# Patient Record
Sex: FEMALE | Race: WHITE | NOT HISPANIC OR LATINO | URBAN - METROPOLITAN AREA
[De-identification: names, ages, dates, MRNs, and addresses within clinical notes are randomized per-mention and may not be internally consistent; named-entity substitution may affect disease eponyms.]

---

## 2023-01-05 ENCOUNTER — OFFICE VISIT (OUTPATIENT)
Dept: ENDOCRINOLOGY | Facility: CLINIC | Age: 23
End: 2023-01-05
Attending: INTERNAL MEDICINE
Payer: MEDICAID

## 2023-01-05 DIAGNOSIS — Z79.899 TRANSGENDER MAN ON HORMONE THERAPY: ICD-10-CM

## 2023-01-05 DIAGNOSIS — F64.0 TRANSGENDER MAN ON HORMONE THERAPY: ICD-10-CM

## 2023-01-05 PROCEDURE — 1160F RVW MEDS BY RX/DR IN RCRD: CPT | Mod: CPTII,95,, | Performed by: INTERNAL MEDICINE

## 2023-01-05 PROCEDURE — 1160F PR REVIEW ALL MEDS BY PRESCRIBER/CLIN PHARMACIST DOCUMENTED: ICD-10-PCS | Mod: CPTII,95,, | Performed by: INTERNAL MEDICINE

## 2023-01-05 PROCEDURE — 99204 OFFICE O/P NEW MOD 45 MIN: CPT | Mod: 95,,, | Performed by: INTERNAL MEDICINE

## 2023-01-05 PROCEDURE — 1159F MED LIST DOCD IN RCRD: CPT | Mod: CPTII,95,, | Performed by: INTERNAL MEDICINE

## 2023-01-05 PROCEDURE — 1159F PR MEDICATION LIST DOCUMENTED IN MEDICAL RECORD: ICD-10-PCS | Mod: CPTII,95,, | Performed by: INTERNAL MEDICINE

## 2023-01-05 PROCEDURE — 99204 PR OFFICE/OUTPT VISIT, NEW, LEVL IV, 45-59 MIN: ICD-10-PCS | Mod: 95,,, | Performed by: INTERNAL MEDICINE

## 2023-01-05 RX ORDER — TESTOSTERONE CYPIONATE 200 MG/ML
20 INJECTION, SOLUTION INTRAMUSCULAR
Qty: 10 ML | Refills: 5 | Status: SHIPPED | OUTPATIENT
Start: 2023-01-05 | End: 2023-05-15 | Stop reason: SDUPTHER

## 2023-01-05 NOTE — PROGRESS NOTES
Subjective:      Patient ID: Sol Sheikh is a 22 y.o.    Chief Complaint:  Gender    History of Present Illness  With regards to the gender incongruence care:    Gender identity - trans masc    Pronouns:  he/him  or they/them     Goals of therapy:  Deeper voice, increased body hair, increased facial hair    Sexual activity:  men women          Therapist at the time hormones were started: yes has a therapist (not for gender) but she is supportive     Gender Surgeries - none, but interested in mastectomy          Fertility concerns - not  interested    Has not started cross hormone therapy yet         LMP: reg        Partner-- transman afab          Social:   Work /School- [employer]   Will use their college program   Family -  supportive     Housing - lives at home       part time at  - has dementia     Has been diagnosed with ADHD     New concerns today:     None         ROS:   As above    Objective:     There were no vitals taken for this visit.    There is no height or weight on file to calculate BMI.      Physical Exam  Constitutional:       Appearance: Normal appearance.   Psychiatric:         Attention and Perception: Attention normal.         Mood and Affect: Mood normal.         Speech: Speech normal.         Behavior: Behavior normal.         Thought Content: Thought content normal.         Judgment: Judgment normal.              Lab Review:   No results found for: HGBA1C  No results found for: CHOL, HDL, LDLCALC, TRIG, CHOLHDL  No results found for: NA, K, CL, CO2, GLU, BUN, CREATININE, CALCIUM, PROT, ALBUMIN, BILITOT, ALKPHOS, AST, ALT, ANIONGAP, ESTGFRAFRICA, EGFRNONAA, TSH  No results found for: [id number]    Assessment and Plan     trans masculine (he/they)  Transman: gender incongruence   Reviewed therapy, side effects (both wanted and unwanted), possible adverse outcomes, expectations, compliance.  Reviewed what changes would be irreversible even with cessation of therapy (including facial hair,  body hair, voice deepening and clitoral enlargement). Reviewed limited data on fertility    Reviewed the need for contraception as testosterone is not a contraceptive if having vaginal sex with non-trans men       Patient encouraged to continue working with his therapist during the transition process.      Will start  Testosterone replacement therapy 20 mg q 1 week     His partner will help him with the injections.  If he has any difficulty he will let us know in any will come to clinic for injection Education      RTC in 3 months with labs   Noting  Nl hh/ for men is:     Hemoglobin 14.0-18.0 g/dL    Hematocrit 40.0-54.0 %        Healthy lifestyle stressed  Discussed indications  for a Pap.       Reviewed risks and benefits.  ? Possible increase cad  Expected increase in h/h, changes in lipids and body composition   Possible liver effects  He understands and wishes to proceed       The patient location is: home  The chief complaint leading to consultation is: gender    Visit type: audiovisual    Face to Face time with patient: 25 minutes of total time spent on the encounter, which includes face to face time and non-face to face time preparing to see the patient (eg, review of tests), Obtaining and/or reviewing separately obtained history, Documenting clinical information in the electronic or other health record, Independently interpreting results (not separately reported) and communicating results to the patient/family/caregiver, or Care coordination (not separately reported).         Each patient to whom he or she provides medical services by telemedicine is:  (1) informed of the relationship between the physician and patient and the respective role of any other health care provider with respect to management of the patient; and (2) notified that he or she may decline to receive medical services by telemedicine and may withdraw from such care at any time.

## 2023-01-05 NOTE — ASSESSMENT & PLAN NOTE
Transman: gender incongruence   Reviewed therapy, side effects (both wanted and unwanted), possible adverse outcomes, expectations, compliance.  Reviewed what changes would be irreversible even with cessation of therapy (including facial hair, body hair, voice deepening and clitoral enlargement). Reviewed limited data on fertility    Reviewed the need for contraception as testosterone is not a contraceptive if having vaginal sex with non-trans men       Patient encouraged to continue working with his therapist during the transition process.      Will start  Testosterone replacement therapy 20 mg q 1 week     His partner will help him with the injections.  If he has any difficulty he will let us know in any will come to clinic for injection Education      RTC in 3 months with labs   Noting  Nl hh/ for men is:     Hemoglobin 14.0-18.0 g/dL    Hematocrit 40.0-54.0 %        Healthy lifestyle stressed  Discussed indications  for a Pap.       Reviewed risks and benefits.  ? Possible increase cad  Expected increase in h/h, changes in lipids and body composition   Possible liver effects  He understands and wishes to proceed

## 2023-01-05 NOTE — PATIENT INSTRUCTIONS
Thank you for completing a virtual visit with me!     Per our conversation, I will send in the prescription for low-dose injectable testosterone to the pharmacy on file.  Please let my office know if you have any difficulty with the injections so we can have you come in for teaching.  Please see chart below regarding expectations with testosterone therapy, timeline, and what is reversible and irreversible    We will plan a telemedicine or an in-clinic visit in 3 months with labs prior to that appointment.    My staff will contact you to schedule the above.     Please let me know if you have any other questions.    Thank you,  Jeanne Paredes MD

## 2023-01-09 ENCOUNTER — TELEPHONE (OUTPATIENT)
Dept: ENDOCRINOLOGY | Facility: CLINIC | Age: 23
End: 2023-01-09
Payer: COMMERCIAL

## 2023-01-09 NOTE — TELEPHONE ENCOUNTER
----- Message from Klaudia Regalado MA sent at 1/6/2023  6:20 PM CST -----  Regarding: FW: Pt Advice  Contact: [phone]    ----- Message -----  From: Dottie Alejo  Sent: 1/6/2023   4:33 PM CST  To: [facility] Staff  Subject: Pt Advice                                        Pt is calling with advice on where to administer the Depo testosterone.  Please call.

## 2023-05-15 ENCOUNTER — OFFICE VISIT (OUTPATIENT)
Dept: ENDOCRINOLOGY | Facility: CLINIC | Age: 23
End: 2023-05-15
Payer: COMMERCIAL

## 2023-05-15 DIAGNOSIS — F64.0 TRANSGENDER MAN ON HORMONE THERAPY: ICD-10-CM

## 2023-05-15 DIAGNOSIS — Z79.899 TRANSGENDER MAN ON HORMONE THERAPY: ICD-10-CM

## 2023-05-15 PROCEDURE — 99214 PR OFFICE/OUTPT VISIT, EST, LEVL IV, 30-39 MIN: ICD-10-PCS | Mod: 95,,, | Performed by: INTERNAL MEDICINE

## 2023-05-15 PROCEDURE — 1160F RVW MEDS BY RX/DR IN RCRD: CPT | Mod: CPTII,95,, | Performed by: INTERNAL MEDICINE

## 2023-05-15 PROCEDURE — 1160F PR REVIEW ALL MEDS BY PRESCRIBER/CLIN PHARMACIST DOCUMENTED: ICD-10-PCS | Mod: CPTII,95,, | Performed by: INTERNAL MEDICINE

## 2023-05-15 PROCEDURE — 99214 OFFICE O/P EST MOD 30 MIN: CPT | Mod: 95,,, | Performed by: INTERNAL MEDICINE

## 2023-05-15 PROCEDURE — 1159F PR MEDICATION LIST DOCUMENTED IN MEDICAL RECORD: ICD-10-PCS | Mod: CPTII,95,, | Performed by: INTERNAL MEDICINE

## 2023-05-15 PROCEDURE — 1159F MED LIST DOCD IN RCRD: CPT | Mod: CPTII,95,, | Performed by: INTERNAL MEDICINE

## 2023-05-15 RX ORDER — TESTOSTERONE CYPIONATE 200 MG/ML
40 INJECTION, SOLUTION INTRAMUSCULAR
Qty: 1 ML | Refills: 0 | Status: SHIPPED | OUTPATIENT
Start: 2023-05-15 | End: 2023-06-14

## 2023-05-15 RX ORDER — FERROUS SULFATE TAB 325 MG (65 MG ELEMENTAL FE) 325 (65 FE) MG
TAB ORAL EVERY OTHER DAY
COMMUNITY
Start: 2023-04-28

## 2023-05-15 RX ORDER — DEXTROAMPHETAMINE SACCHARATE, AMPHETAMINE ASPARTATE MONOHYDRATE, DEXTROAMPHETAMINE SULFATE AND AMPHETAMINE SULFATE 5; 5; 5; 5 MG/1; MG/1; MG/1; MG/1
CAPSULE, EXTENDED RELEASE ORAL
COMMUNITY
Start: 2022-09-13

## 2023-05-15 NOTE — PATIENT INSTRUCTIONS
Thank you for completing a virtual visit with me!     Per our conversation, once I get back your recent labs back... we can increase your dose of testosterone to 40 mg weekly - this is 0.2 mL            My nurse will reach out to arrange for a quick in clinic visit soon.  Let me know if you have any questions     Thank you,  Jeanne Paredes MD

## 2023-05-15 NOTE — ASSESSMENT & PLAN NOTE
gender incongruence   Reviewed therapy, side effects (both wanted and unwanted), possible adverse outcomes, expectations, compliance.  Reviewed what changes would be irreversible even with cessation of therapy (including facial hair, body hair, voice deepening and clitoral enlargement).      Patient encouraged to continue working with his therapist during the transition process.      Will increase Testosterone replacement therapy 40 mg q 1 week  - once I see a recent labs    Come in for a visit May 26 with his partner for the once yearly clinic visit      RTC in 3 months with labs   Noting  Nl hh/ for men is:     Hemoglobin 14.0-18.0 g/dL    Hematocrit 40.0-54.0 %        Healthy lifestyle stressed  Discussed indications  for a Pap.       Reviewed risks and benefits.  ? Possible increase cad  Expected increase in h/h, changes in lipids and body composition   Possible liver effects

## 2023-05-15 NOTE — Clinical Note
Need recent labs that were drawn at [facility]. Please put on May 26 in the morning also his partner who is also a trans man needs an appointment at the same time as they are driving from [place].

## 2023-05-15 NOTE — PROGRESS NOTES
Subjective:      Patient ID: Sol Sheikh is a 22 y.o.    Chief Complaint:  Gender    History of Present Illness  With regards to the gender incongruence care:    Gender identity - trans masc    Pronouns:  he/him  or they/them     Goals of therapy:  Deeper voice, increased body hair, increased facial hair    Sexual activity:  men women          Therapist at the time hormones were started: yes has a therapist (not for gender) but she is supportive     Gender Surgeries - none, but interested in mastectomy          Fertility concerns - not  interested      started cross hormone therapy - January of 2023   Current regimen testosterone 20 mg weekly   His partner was injecting --now he has been trying but feels that he is not as successful with it.  He does not want to switch to topical at this time    He is happy he started-He is seeing positive changes.         LMP: feb 2023         Partner-- mathew rebolledo          Social:   Work /School- [employer]   Will use their college program   Family -  supportive     Housing - lives at home       part time at  - has dementia     Has been diagnosed with ADHD     New concerns today:     None         ROS:   As above    Objective:     There were no vitals taken for this visit.    There is no height or weight on file to calculate BMI.      Physical Exam  Constitutional:       Appearance: Normal appearance.   Psychiatric:         Attention and Perception: Attention normal.         Mood and Affect: Mood normal.         Speech: Speech normal.         Behavior: Behavior normal.         Thought Content: Thought content normal.         Judgment: Judgment normal.              Lab Review:  pending  -  [facility]   No results found for: HGBA1C  No results found for: CHOL, HDL, LDLCALC, TRIG, CHOLHDL  No results found for: NA, K, CL, CO2, GLU, BUN, CREATININE, CALCIUM, PROT, ALBUMIN, BILITOT, ALKPHOS, AST, ALT, ANIONGAP, ESTGFRAFRICA, EGFRNONAA, TSH  No results found for:  [id number]    Assessment and Plan     trans masculine (he/they)   gender incongruence   Reviewed therapy, side effects (both wanted and unwanted), possible adverse outcomes, expectations, compliance.  Reviewed what changes would be irreversible even with cessation of therapy (including facial hair, body hair, voice deepening and clitoral enlargement).      Patient encouraged to continue working with his therapist during the transition process.      Will increase Testosterone replacement therapy 40 mg q 1 week  - once I see a recent labs    Come in for a visit May 26 with his partner for the once yearly clinic visit      RTC in 3 months with labs   Noting  Nl hh/ for men is:     Hemoglobin 14.0-18.0 g/dL    Hematocrit 40.0-54.0 %        Healthy lifestyle stressed  Discussed indications  for a Pap.       Reviewed risks and benefits.  ? Possible increase cad  Expected increase in h/h, changes in lipids and body composition   Possible liver effects       The patient location is: home  The chief complaint leading to consultation is: gender    Visit type: audiovisual    Face to Face time with patient: 25 minutes of total time spent on the encounter, which includes face to face time and non-face to face time preparing to see the patient (eg, review of tests), Obtaining and/or reviewing separately obtained history, Documenting clinical information in the electronic or other health record, Independently interpreting results (not separately reported) and communicating results to the patient/family/caregiver, or Care coordination (not separately reported).         Each patient to whom he or she provides medical services by telemedicine is:  (1) informed of the relationship between the physician and patient and the respective role of any other health care provider with respect to management of the patient; and (2) notified that he or she may decline to receive medical services by telemedicine and may withdraw from such care  at any time.

## 2023-05-16 ENCOUNTER — PATIENT MESSAGE (OUTPATIENT)
Dept: ENDOCRINOLOGY | Facility: CLINIC | Age: 23
End: 2023-05-16
Payer: COMMERCIAL

## 2023-05-22 ENCOUNTER — TELEPHONE (OUTPATIENT)
Dept: ENDOCRINOLOGY | Facility: CLINIC | Age: 23
End: 2023-05-22
Payer: COMMERCIAL

## 2023-05-26 ENCOUNTER — OFFICE VISIT (OUTPATIENT)
Dept: ENDOCRINOLOGY | Facility: CLINIC | Age: 23
End: 2023-05-26
Attending: INTERNAL MEDICINE
Payer: COMMERCIAL

## 2023-05-26 DIAGNOSIS — Z79.899 TRANSGENDER MAN ON HORMONE THERAPY: Primary | ICD-10-CM

## 2023-05-26 DIAGNOSIS — F64.0 TRANSGENDER MAN ON HORMONE THERAPY: Primary | ICD-10-CM

## 2023-05-26 PROCEDURE — 99499 NO LOS: ICD-10-PCS | Mod: 95,,, | Performed by: INTERNAL MEDICINE

## 2023-05-26 PROCEDURE — 99499 UNLISTED E&M SERVICE: CPT | Mod: 95,,, | Performed by: INTERNAL MEDICINE

## 2023-05-26 NOTE — PROGRESS NOTES
Subjective:       Needs in clinic visit     Lab Review:  5/12  T 513  Tsh 1.1  Cmp ok  Chol 113  Trig 36   Hdl 53  H/h 12.8/40.8  Vit D < 5

## 2024-06-20 ENCOUNTER — OFFICE VISIT (OUTPATIENT)
Dept: ENDOCRINOLOGY | Facility: CLINIC | Age: 24
End: 2024-06-20
Payer: COMMERCIAL

## 2024-06-20 VITALS
SYSTOLIC BLOOD PRESSURE: 128 MMHG | HEART RATE: 102 BPM | BODY MASS INDEX: 23.76 KG/M2 | WEIGHT: 147.81 LBS | HEIGHT: 66 IN | DIASTOLIC BLOOD PRESSURE: 72 MMHG | OXYGEN SATURATION: 99 %

## 2024-06-20 DIAGNOSIS — F64.0 TRANSGENDER MAN ON HORMONE THERAPY: ICD-10-CM

## 2024-06-20 DIAGNOSIS — Z79.899 TRANSGENDER MAN ON HORMONE THERAPY: ICD-10-CM

## 2024-06-20 PROCEDURE — 1159F MED LIST DOCD IN RCRD: CPT | Mod: CPTII,S$GLB,, | Performed by: INTERNAL MEDICINE

## 2024-06-20 PROCEDURE — 99999 PR PBB SHADOW E&M-EST. PATIENT-LVL III: CPT | Mod: PBBFAC,,, | Performed by: INTERNAL MEDICINE

## 2024-06-20 PROCEDURE — 1160F RVW MEDS BY RX/DR IN RCRD: CPT | Mod: CPTII,S$GLB,, | Performed by: INTERNAL MEDICINE

## 2024-06-20 PROCEDURE — 3078F DIAST BP <80 MM HG: CPT | Mod: CPTII,S$GLB,, | Performed by: INTERNAL MEDICINE

## 2024-06-20 PROCEDURE — 3008F BODY MASS INDEX DOCD: CPT | Mod: CPTII,S$GLB,, | Performed by: INTERNAL MEDICINE

## 2024-06-20 PROCEDURE — 99214 OFFICE O/P EST MOD 30 MIN: CPT | Mod: S$GLB,,, | Performed by: INTERNAL MEDICINE

## 2024-06-20 PROCEDURE — G2211 COMPLEX E/M VISIT ADD ON: HCPCS | Mod: S$GLB,,, | Performed by: INTERNAL MEDICINE

## 2024-06-20 PROCEDURE — 3074F SYST BP LT 130 MM HG: CPT | Mod: CPTII,S$GLB,, | Performed by: INTERNAL MEDICINE

## 2024-06-20 RX ORDER — TESTOSTERONE CYPIONATE 200 MG/ML
100 INJECTION, SOLUTION INTRAMUSCULAR
Qty: 4 ML | Refills: 5 | Status: SHIPPED | OUTPATIENT
Start: 2024-06-20 | End: 2024-07-20

## 2024-06-20 RX ORDER — ATOMOXETINE 18 MG/1
CAPSULE ORAL
COMMUNITY
Start: 2024-06-19

## 2024-06-20 RX ORDER — BUPROPION HYDROCHLORIDE 150 MG/1
150 TABLET ORAL
COMMUNITY
Start: 2024-05-22

## 2024-06-20 NOTE — PROGRESS NOTES
"Subjective:      Patient ID: Sol Sheikh is a 23 y.o.    Chief Complaint:  Gender last in clinic visit 6/20/24     History of Present Illness  With regards to the gender incongruence care:    Gender identity - trans masc    Pronouns:  he/him       Goals of therapy:  Deeper voice, increased body hair, increased facial hair        Therapist at the time hormones were started: yes has a therapist (not for gender) but she is supportive     Gender Surgeries - none, but interested in mastectomy          Fertility concerns - not  interested      started cross hormone therapy - January of 2023   Was on testosterone 60- 80 mg weekly  - stopped when moved to [place]   Wants to restart - feels better on it           LMP: 4/2024        Partner-- mathew rebolledo  - here with him today         Social:   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      Physical Exam  Vitals reviewed.   Constitutional:       Appearance: Normal appearance.   Neck:      Comments: No goiter   Cardiovascular:      Rate and Rhythm: Normal rate.   Pulmonary:      Effort: Pulmonary effort is normal.   Abdominal:      Palpations: Abdomen is soft.   Musculoskeletal:      Right lower leg: No edema.      Left lower leg: No edema.   Psychiatric:         Mood and Affect: Mood normal.                Lab Review:     5/23/24  H/h 12/39  [facility]    Assessment and Plan     trans masculine (he/they)   gender incongruence   Reviewed therapy, side effects (both wanted and unwanted), possible adverse outcomes, expectations, compliance.  Reviewed what changes would be " irreversible even with cessation of therapy (including facial hair, body hair, voice deepening and clitoral enlargement).      Patient encouraged to continue working with his therapist during the transition process.      Will restart  Testosterone replacement therapy-- the preferences full dose, 100 mg weekly     Labs and a virtual visit in 3 months   In clinic visit in June of 2025       Noting  Nl hh/ for men is:     Hemoglobin 14.0-18.0 g/dL    Hematocrit 40.0-54.0 %        Healthy lifestyle stressed  Discussed indications  for a Pap.       Reviewed risks and benefits.  ? Possible increase cad  Expected increase in h/h, changes in lipids and body composition   Possible liver effects

## 2024-06-20 NOTE — ASSESSMENT & PLAN NOTE
gender incongruence   Reviewed therapy, side effects (both wanted and unwanted), possible adverse outcomes, expectations, compliance.  Reviewed what changes would be irreversible even with cessation of therapy (including facial hair, body hair, voice deepening and clitoral enlargement).      Patient encouraged to continue working with his therapist during the transition process.      Will restart  Testosterone replacement therapy-- the preferences full dose, 100 mg weekly     Labs and a virtual visit in 3 months   In clinic visit in June of 2025       Noting  Nl hh/ for men is:     Hemoglobin 14.0-18.0 g/dL    Hematocrit 40.0-54.0 %        Healthy lifestyle stressed  Discussed indications  for a Pap.       Reviewed risks and benefits.  ? Possible increase cad  Expected increase in h/h, changes in lipids and body composition   Possible liver effects

## 2024-09-12 ENCOUNTER — TELEPHONE (OUTPATIENT)
Dept: ENDOCRINOLOGY | Facility: CLINIC | Age: 24
End: 2024-09-12
Payer: COMMERCIAL

## 2024-09-12 ENCOUNTER — OFFICE VISIT (OUTPATIENT)
Dept: ENDOCRINOLOGY | Facility: CLINIC | Age: 24
End: 2024-09-12
Attending: INTERNAL MEDICINE
Payer: COMMERCIAL

## 2024-09-12 DIAGNOSIS — F64.0 TRANSGENDER MAN ON HORMONE THERAPY: Primary | ICD-10-CM

## 2024-09-12 DIAGNOSIS — Z79.899 TRANSGENDER MAN ON HORMONE THERAPY: Primary | ICD-10-CM

## 2024-09-12 RX ORDER — PAROXETINE 10 MG/1
10 TABLET, FILM COATED ORAL DAILY
COMMUNITY
Start: 2024-08-15

## 2024-09-12 NOTE — ASSESSMENT & PLAN NOTE
gender incongruence   Reviewed therapy, side effects (both wanted and unwanted), possible adverse outcomes, expectations, compliance.  Reviewed what changes would be irreversible even with cessation of therapy (including facial hair, body hair, voice deepening and clitoral enlargement).      Patient encouraged to continue working with his therapist during the transition process.      on Testosterone replacement therapy-- the preferences full dose, 100 mg weekly     Need labs asap     In clinic visit in June of 2025       Noting  Nl hh/ for men is:     Hemoglobin 14.0-18.0 g/dL    Hematocrit 40.0-54.0 %        Healthy lifestyle stressed     Reviewed risks and benefits.  ? Possible increase cad  Expected increase in h/h, changes in lipids and body composition   Possible liver effects

## 2024-09-12 NOTE — PATIENT INSTRUCTIONS
Thank you for completing a virtual visit with me!     Per our conversation, My nurse reach out to have blood work done soon. If all looks OK we will see you next June.    Please let me know if you have any other questions.    Thank you,  Jeanne Paredes MD

## 2024-09-12 NOTE — PROGRESS NOTES
Subjective:      Patient ID: Sol Sheikh is a 23 y.o.    Chief Complaint:  Gender last in clinic visit 6/20/24     History of Present Illness  With regards to the gender incongruence care:    Gender identity - trans masc    Pronouns:  he/him       Goals of therapy:  Deeper voice, increased body hair, increased facial hair        Therapist at the time hormones were started: yes has a therapist (not for gender) but she is supportive     Gender Surgeries - none, but interested in mastectomy          Fertility concerns - not  interested      started cross hormone therapy - January of 2023 - stopped when moved to [place]    Restarted 6/2024   Current dose 100 mg sq weekly -- tues     He is happy he restarted            LMP: 4/2024        Partner-- transman afab  - supportive          Social:   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    There is no height or weight on file to calculate BMI.      Physical Exam  Constitutional:       Appearance: Normal appearance.   Psychiatric:         Attention and Perception: Attention normal.         Mood and Affect: Mood normal.         Speech: Speech normal.         Behavior: Behavior normal.         Thought Content: Thought content normal.         Judgment: Judgment normal.                Lab Review:     none    Assessment and Plan     trans masculine (he/they)   gender incongruence   Reviewed therapy, side effects (both wanted and unwanted), possible adverse outcomes, expectations, compliance.  Reviewed what changes would be irreversible even with cessation of therapy (including facial hair, body hair, voice deepening and clitoral enlargement).      Patient encouraged to continue working with his therapist during the transition process.       on Testosterone replacement therapy-- the preferences full dose, 100 mg weekly     Need labs asap     In clinic visit in June of 2025       Noting  Nl hh/ for men is:     Hemoglobin 14.0-18.0 g/dL    Hematocrit 40.0-54.0 %        Healthy lifestyle stressed     Reviewed risks and benefits.  ? Possible increase cad  Expected increase in h/h, changes in lipids and body composition   Possible liver effects        The patient location is: [place]  The chief complaint leading to consultation is: above     Visit type: audiovisual    Level of service is based on medical decision-making and not time      Each patient to whom he or she provides medical services by telemedicine is:  (1) informed of the relationship between the physician and patient and the respective role of any other health care provider with respect to management of the patient; and (2) notified that he or she may decline to receive medical services by telemedicine and may withdraw from such care at any time.

## 2024-09-13 NOTE — TELEPHONE ENCOUNTER
Spoke with patient to get external labs fax to resend order so labwork can get done Mon or Tues next week. Got efax email stating fax accepted.

## 2025-02-14 ENCOUNTER — OFFICE VISIT (OUTPATIENT)
Dept: ENDOCRINOLOGY | Facility: CLINIC | Age: 25
End: 2025-02-14
Payer: COMMERCIAL

## 2025-02-14 VITALS
DIASTOLIC BLOOD PRESSURE: 72 MMHG | SYSTOLIC BLOOD PRESSURE: 121 MMHG | BODY MASS INDEX: 23.38 KG/M2 | HEIGHT: 69 IN | WEIGHT: 157.88 LBS

## 2025-02-14 DIAGNOSIS — F64.0 TRANSGENDER MAN ON HORMONE THERAPY: ICD-10-CM

## 2025-02-14 DIAGNOSIS — Z79.899 TRANSGENDER MAN ON HORMONE THERAPY: ICD-10-CM

## 2025-02-14 PROCEDURE — 99999 PR PBB SHADOW E&M-EST. PATIENT-LVL III: CPT | Mod: PBBFAC,,, | Performed by: INTERNAL MEDICINE

## 2025-02-14 RX ORDER — TESTOSTERONE CYPIONATE 200 MG/ML
100 INJECTION, SOLUTION INTRAMUSCULAR
Status: COMPLETED | OUTPATIENT
Start: 2025-02-14 | End: 2025-02-14

## 2025-02-14 RX ORDER — TESTOSTERONE CYPIONATE 200 MG/ML
100 INJECTION, SOLUTION INTRAMUSCULAR
Qty: 4 ML | Refills: 5 | Status: SHIPPED | OUTPATIENT
Start: 2025-02-14 | End: 2025-03-16

## 2025-02-14 RX ADMIN — TESTOSTERONE CYPIONATE 100 MG: 200 INJECTION, SOLUTION INTRAMUSCULAR at 10:02

## 2025-02-14 NOTE — ASSESSMENT & PLAN NOTE
gender incongruence   Reviewed therapy, side effects (both wanted and unwanted), possible adverse outcomes, expectations, compliance.  Reviewed what changes would be irreversible even with cessation of therapy (including facial hair, body hair, voice deepening and clitoral enlargement).      Patient encouraged to continue working with his therapist during the transition process.      Restart with 100 mg given in clinic.  Virtual visit in 3 months with labs prior     In clinic visit yearly     Noting  Nl hh/ for men is:     Hemoglobin 14.0-18.0 g/dL    Hematocrit 40.0-54.0 %        Healthy lifestyle stressed     Reviewed risks and benefits.  ? Possible increase cad  Expected increase in h/h, changes in lipids and body composition   Possible liver effects

## 2025-02-14 NOTE — PROGRESS NOTES
"Subjective:      Patient ID: Sol Sheikh is a 24 y.o.    Chief Complaint:  Gender last in clinic visit 2/14/25     History of Present Illness  With regards to the gender incongruence care:    Gender identity - trans masc    Pronouns:  he/him       Goals of therapy:  Deeper voice, increased body hair, increased facial hair      Has not changed name or gender marker legally     Therapist at the time hormones were started: yes has a therapist (not for gender) but she is supportive     Gender Surgeries - none, but interested in mastectomy          Fertility concerns - not  interested      started cross hormone therapy - January of 2023 - stopped when moved to [place]    Restarted 6/2024   Current dose 100 mg sq weekly -- tues   -- out                LMP: jan 2025        Partner-- transman afab  - supportive          Social:   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      Physical Exam  Vitals reviewed.   Constitutional:       Appearance: Normal appearance.   Neck:      Comments: No goiter   Cardiovascular:      Rate and Rhythm: Normal rate.   Pulmonary:      Effort: Pulmonary effort is normal.   Abdominal:      Palpations: Abdomen is soft.   Musculoskeletal:      Right lower leg: No edema.      Left lower leg: No edema.   Psychiatric:         Mood and Affect: Mood normal.                Lab Review:     none    Assessment and Plan     trans masculine (he/they)   gender incongruence   Reviewed therapy, side effects (both wanted and unwanted), possible adverse outcomes, expectations, compliance.  Reviewed what changes would be irreversible even with " cessation of therapy (including facial hair, body hair, voice deepening and clitoral enlargement).      Patient encouraged to continue working with his therapist during the transition process.      Restart with 100 mg given in clinic.  Virtual visit in 3 months with labs prior     In clinic visit yearly     Noting  Nl hh/ for men is:     Hemoglobin 14.0-18.0 g/dL    Hematocrit 40.0-54.0 %        Healthy lifestyle stressed     Reviewed risks and benefits.  ? Possible increase cad  Expected increase in h/h, changes in lipids and body composition   Possible liver effects

## 2025-02-14 NOTE — PROGRESS NOTES
Educated pt. On proper technique of injecting testosterone, drawing up & administering using aseptic technique.  Pt. Demonstrated understanding & self administered injection, 0.5ml of testosterone.

## 2025-05-16 ENCOUNTER — OFFICE VISIT (OUTPATIENT)
Dept: ENDOCRINOLOGY | Facility: CLINIC | Age: 25
End: 2025-05-16
Attending: INTERNAL MEDICINE
Payer: COMMERCIAL

## 2025-05-16 DIAGNOSIS — Z79.899 TRANSGENDER MAN ON HORMONE THERAPY: Primary | ICD-10-CM

## 2025-05-16 DIAGNOSIS — F64.0 TRANSGENDER MAN ON HORMONE THERAPY: Primary | ICD-10-CM

## 2025-05-16 NOTE — PROGRESS NOTES
Subjective:      Patient ID: Sol Sheikh is a 24 y.o.    Chief Complaint:  Gender last in clinic visit 2/14/25     History of Present Illness  With regards to the gender incongruence care:    Gender identity - trans masc    Pronouns:  he/him       Goals of therapy:  Deeper voice, increased body hair, increased facial hair      Has not changed name or gender marker legally     Therapist at the time hormones were started: yes has a therapist (not for gender) but she is supportive     Gender Surgeries - none, but interested in mastectomy          Fertility concerns - not  interested      started cross hormone therapy - January of 2023 - stopped when moved to [place]    Restarted 6/2024   Current dose 100 mg sq weekly -- tues                  LMP: jan 2025         Partner-- transman afab  - supportive          Social:   Work /School- [employer] 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    There is no height or weight on file to calculate BMI.      Physical Exam  Constitutional:       Appearance: Normal appearance.   Psychiatric:         Attention and Perception: Attention normal.         Mood and Affect: Mood normal.         Speech: Speech normal.         Behavior: Behavior normal.         Thought Content: Thought content normal.         Judgment: Judgment normal.                Lab Review:     Care everywhere - after they were on testosterone  CMP and CBC are both normal    Assessment and Plan     trans masculine (he/they)   gender incongruence   Reviewed therapy, side effects (both wanted and unwanted), possible adverse outcomes, expectations, compliance.  Reviewed what changes would be irreversible even with cessation of therapy (including facial hair, body hair, voice deepening and clitoral  enlargement).               Noting  Nl hh/ for men is:     Hemoglobin 14.0-18.0 g/dL    Hematocrit 40.0-54.0 %        Healthy lifestyle stressed     Reviewed risks and benefits.  ? Possible increase cad  Expected increase in h/h, changes in lipids and body composition   Possible liver effects         The patient location is: [place]  The chief complaint leading to consultation is: above     Visit type: audiovisual    Level of service is based on medical decision-making and not time      Each patient to whom he or she provides medical services by telemedicine is:  (1) informed of the relationship between the physician and patient and the respective role of any other health care provider with respect to management of the patient; and (2) notified that he or she may decline to receive medical services by telemedicine and may withdraw from such care at any time.

## 2025-05-16 NOTE — PATIENT INSTRUCTIONS
Thank you for completing a virtual visit with me!     Per our conversation, we will see you for an in clinic visit in February of 2026.  Let me know if you need me prior.    Please let me know if you have any other questions.    Thank you,  Jeanne Paredes MD

## 2025-05-16 NOTE — ASSESSMENT & PLAN NOTE
gender incongruence   Reviewed therapy, side effects (both wanted and unwanted), possible adverse outcomes, expectations, compliance.  Reviewed what changes would be irreversible even with cessation of therapy (including facial hair, body hair, voice deepening and clitoral enlargement).               Noting  Nl hh/ for men is:     Hemoglobin 14.0-18.0 g/dL    Hematocrit 40.0-54.0 %        Healthy lifestyle stressed     Reviewed risks and benefits.  ? Possible increase cad  Expected increase in h/h, changes in lipids and body composition   Possible liver effects